# Patient Record
Sex: FEMALE | URBAN - METROPOLITAN AREA
[De-identification: names, ages, dates, MRNs, and addresses within clinical notes are randomized per-mention and may not be internally consistent; named-entity substitution may affect disease eponyms.]

---

## 2018-07-03 ENCOUNTER — TELEPHONE (OUTPATIENT)
Dept: TRANSPLANT | Facility: CLINIC | Age: 58
End: 2018-07-03

## 2018-07-03 NOTE — TELEPHONE ENCOUNTER
----- Message from Michael Ervin sent at 7/3/2018  7:21 AM CDT -----  Regarding: FW: Outside patient referral  Have medical recorders that where scanned into media from [facility] [place]. Will call referring MD office if we need any additional information on the pt.    By: Michael Ervin  ----- Message -----  From: Maryse Hallman  Sent: 7/2/2018   4:39 PM  To: [facility] Liver Referral Pool  Subject: Outside patient referral                         Good afternoon,    Patient is being referred to Hepatology for Hep C treatment. Referral and records have been scanned into  for review.    Thanks!  Maryse

## 2018-07-05 ENCOUNTER — DOCUMENTATION ONLY (OUTPATIENT)
Dept: TRANSPLANT | Facility: CLINIC | Age: 58
End: 2018-07-05

## 2018-07-05 NOTE — NURSING
Pt records reviewed.  Pt will be referred to Hep C  Hep C with esophageal varies  Abnormal lab values   Initial referral received  from Dr. Bryce Shelby   Referral letter sent to provider and patient.

## 2018-08-02 ENCOUNTER — CLINICAL SUPPORT (OUTPATIENT)
Dept: INFECTIOUS DISEASES | Facility: CLINIC | Age: 58
End: 2018-08-02
Payer: COMMERCIAL

## 2018-08-02 ENCOUNTER — LAB VISIT (OUTPATIENT)
Dept: LAB | Facility: HOSPITAL | Age: 58
End: 2018-08-02
Payer: COMMERCIAL

## 2018-08-02 ENCOUNTER — INITIAL CONSULT (OUTPATIENT)
Dept: HEPATOLOGY | Facility: CLINIC | Age: 58
End: 2018-08-02
Payer: COMMERCIAL

## 2018-08-02 VITALS
BODY MASS INDEX: 24.59 KG/M2 | HEART RATE: 63 BPM | TEMPERATURE: 98 F | RESPIRATION RATE: 18 BRPM | WEIGHT: 153 LBS | HEIGHT: 66 IN | OXYGEN SATURATION: 98 % | DIASTOLIC BLOOD PRESSURE: 69 MMHG | SYSTOLIC BLOOD PRESSURE: 135 MMHG

## 2018-08-02 DIAGNOSIS — K74.60 HEPATIC CIRRHOSIS, UNSPECIFIED HEPATIC CIRRHOSIS TYPE, UNSPECIFIED WHETHER ASCITES PRESENT: ICD-10-CM

## 2018-08-02 DIAGNOSIS — Z23 NEED FOR HEPATITIS VACCINATION: ICD-10-CM

## 2018-08-02 DIAGNOSIS — Z23 NEED FOR HEPATITIS VACCINATION: Primary | ICD-10-CM

## 2018-08-02 DIAGNOSIS — R74.8 ABNORMAL TRANSAMINASES: ICD-10-CM

## 2018-08-02 DIAGNOSIS — K76.6 PORTAL VENOUS HYPERTENSION: ICD-10-CM

## 2018-08-02 DIAGNOSIS — B18.2 CHRONIC HEPATITIS C WITHOUT HEPATIC COMA: Primary | ICD-10-CM

## 2018-08-02 DIAGNOSIS — B18.2 CHRONIC HEPATITIS C WITHOUT HEPATIC COMA: ICD-10-CM

## 2018-08-02 DIAGNOSIS — I85.10 SECONDARY ESOPHAGEAL VARICES WITHOUT BLEEDING: ICD-10-CM

## 2018-08-02 DIAGNOSIS — K21.9 GASTROESOPHAGEAL REFLUX DISEASE, ESOPHAGITIS PRESENCE NOT SPECIFIED: ICD-10-CM

## 2018-08-02 LAB
AFP SERPL-MCNC: 47 NG/ML
ALBUMIN SERPL BCP-MCNC: 3.9 G/DL
ALP SERPL-CCNC: 120 U/L
ALT SERPL W/O P-5'-P-CCNC: 107 U/L
ANION GAP SERPL CALC-SCNC: 10 MMOL/L
AST SERPL-CCNC: 107 U/L
BASOPHILS # BLD AUTO: 0.06 K/UL
BASOPHILS NFR BLD: 0.9 %
BILIRUB SERPL-MCNC: 0.5 MG/DL
BUN SERPL-MCNC: 10 MG/DL
CALCIUM SERPL-MCNC: 10 MG/DL
CHLORIDE SERPL-SCNC: 104 MMOL/L
CO2 SERPL-SCNC: 27 MMOL/L
CREAT SERPL-MCNC: 0.6 MG/DL
DIFFERENTIAL METHOD: ABNORMAL
EOSINOPHIL # BLD AUTO: 0.2 K/UL
EOSINOPHIL NFR BLD: 2.6 %
ERYTHROCYTE [DISTWIDTH] IN BLOOD BY AUTOMATED COUNT: 12.4 %
EST. GFR  (AFRICAN AMERICAN): >60 ML/MIN/1.73 M^2
EST. GFR  (NON AFRICAN AMERICAN): >60 ML/MIN/1.73 M^2
EXT A-SMA: NORMAL
EXT A1AT LEVEL: NORMAL
EXT A1AT PHENOTYPE: NORMAL
EXT AFP: 44
EXT ALBUMIN: NORMAL
EXT ALP: NORMAL
EXT ALT: NORMAL
EXT AMA: NORMAL
EXT AMYLASE: NORMAL
EXT ANA: NORMAL
EXT ANCA/MPO/PR3 ANTIBODIES: NORMAL
EXT ANTI THYROID AB: NORMAL
EXT ANTI-E.HISTOLYTICA AB: NORMAL
EXT AST: NORMAL
EXT BASOPHIL%: NORMAL
EXT BILIRUBIN DIRECT: NORMAL MG/DL
EXT BILIRUBIN DIRECT: NORMAL MG/DL
EXT BILIRUBIN TOTAL: NORMAL
EXT BILIRUBIN TOTAL: NORMAL
EXT BUN: NORMAL
EXT C-REACTIVE PROTEIN: NORMAL
EXT CA 125: NORMAL
EXT CA 19-9: NORMAL
EXT CALCIUM: NORMAL
EXT CEA: NORMAL
EXT CERULOPLASMIN: NORMAL
EXT CHLORIDE: NORMAL
EXT CHOLESTEROL: NORMAL
EXT CO2: NORMAL
EXT CREATININE: NORMAL MG/DL
EXT CRYOGLOBULIN: NORMAL
EXT EBV DNA BY PCR: NORMAL
EXT ECHINOCOCCUS AB: NORMAL
EXT EOSINOPHIL%: NORMAL
EXT FERRITIN: NORMAL
EXT FOLATE: NORMAL
EXT GGT: NORMAL
EXT GLUCOSE UA: NORMAL
EXT GLUCOSE: NORMAL
EXT HA AB: NORMAL
EXT HBC AB: NORMAL
EXT HBE AB: NORMAL
EXT HBS AB: NORMAL
EXT HBS AG: NORMAL
EXT HBV DNA PCR QUANT: NORMAL
EXT HCV AB: NORMAL
EXT HCV FIBROSURE: NORMAL
EXT HCV GENOTYPE: NORMAL
EXT HCV QUALITATIVE: NORMAL
EXT HCV RNA QUANT PCR: NORMAL
EXT HDL: NORMAL
EXT HEMATOCRIT: NORMAL
EXT HEMOCHROMATOSIS GENE ANALYSIS: NORMAL
EXT HEMOGLOBIN A1C: NORMAL
EXT HEMOGLOBIN A1C: NORMAL
EXT HEMOGLOBIN: NORMAL
EXT HEP B MUTATION, ANALYSIS: NORMAL
EXT HIV: NORMAL
EXT IGG: NORMAL
EXT IGM: NORMAL
EXT INR: NORMAL
EXT INSULIN: NORMAL
EXT IRON SATURATION: NORMAL
EXT LDH, TOTAL: NORMAL
EXT LDH, TOTAL: NORMAL
EXT LDL CHOLESTEROL: NORMAL
EXT LIPASE: NORMAL
EXT LYMPH%: NORMAL
EXT MCH: NORMAL
EXT MCHC: NORMAL
EXT MCV: NORMAL
EXT MONOCYTES%: NORMAL
EXT MPV: NORMAL
EXT NEUT%: NORMAL
EXT PLATELETS: NORMAL
EXT POTASSIUM: NORMAL
EXT PROTEIN TOTAL: NORMAL
EXT PROTEIN TOTAL: NORMAL
EXT PROTHROMBIN TIME, INR (MECH HEART VALVE/ANTIPHOSPHOLIPID): NORMAL
EXT PT: NORMAL
EXT PT: NORMAL
EXT RBC UA: NORMAL
EXT RDW: NORMAL
EXT RETICULOCYTE COUNT: NORMAL
EXT RIBA RESULT: NORMAL
EXT SODIUM: NORMAL MMOL/L
EXT TACROLIMUS LVL: NORMAL
EXT TPMT ENZYME: NORMAL
EXT TPMT GENETICS: NORMAL
EXT TRANSGLUTAMINASE: NORMAL
EXT TRIGLYCERIDES: NORMAL
EXT TRIGLYCERIDES: NORMAL
EXT TSH: NORMAL
EXT VITAMIN B12: NORMAL
EXT WBC: NORMAL
GLUCOSE SERPL-MCNC: 71 MG/DL
HBE AG: NORMAL
HBV CORE AB SERPL QL IA: NEGATIVE
HCT VFR BLD AUTO: 45.4 %
HGB BLD-MCNC: 15.5 G/DL
IMM GRANULOCYTES # BLD AUTO: 0.01 K/UL
IMM GRANULOCYTES NFR BLD AUTO: 0.1 %
INR PPP: 1
LYMPHOCYTES # BLD AUTO: 2.5 K/UL
LYMPHOCYTES NFR BLD: 37.2 %
MCH RBC QN AUTO: 31.6 PG
MCHC RBC AUTO-ENTMCNC: 34.1 G/DL
MCV RBC AUTO: 93 FL
MONOCYTES # BLD AUTO: 0.7 K/UL
MONOCYTES NFR BLD: 9.5 %
NEUTROPHILS # BLD AUTO: 3.4 K/UL
NEUTROPHILS NFR BLD: 49.7 %
NRBC BLD-RTO: 0 /100 WBC
PLATELET # BLD AUTO: 148 K/UL
PMV BLD AUTO: 11.3 FL
POTASSIUM SERPL-SCNC: 3.9 MMOL/L
PROT SERPL-MCNC: 8.9 G/DL
PROTHROMBIN TIME: 10.4 SEC
RBC # BLD AUTO: 4.9 M/UL
SODIUM SERPL-SCNC: 141 MMOL/L
WBC # BLD AUTO: 6.83 K/UL

## 2018-08-02 PROCEDURE — 85610 PROTHROMBIN TIME: CPT

## 2018-08-02 PROCEDURE — 36415 COLL VENOUS BLD VENIPUNCTURE: CPT

## 2018-08-02 PROCEDURE — 85025 COMPLETE CBC W/AUTO DIFF WBC: CPT

## 2018-08-02 PROCEDURE — 99205 OFFICE O/P NEW HI 60 MIN: CPT | Mod: S$GLB,,, | Performed by: PHYSICIAN ASSISTANT

## 2018-08-02 PROCEDURE — 86704 HEP B CORE ANTIBODY TOTAL: CPT

## 2018-08-02 PROCEDURE — 90632 HEPA VACCINE ADULT IM: CPT | Mod: S$GLB,,, | Performed by: INTERNAL MEDICINE

## 2018-08-02 PROCEDURE — 82105 ALPHA-FETOPROTEIN SERUM: CPT

## 2018-08-02 PROCEDURE — 99999 PR PBB SHADOW E&M-EST. PATIENT-LVL IV: CPT | Mod: PBBFAC,,, | Performed by: PHYSICIAN ASSISTANT

## 2018-08-02 PROCEDURE — 80053 COMPREHEN METABOLIC PANEL: CPT

## 2018-08-02 PROCEDURE — 90471 IMMUNIZATION ADMIN: CPT | Mod: S$GLB,,, | Performed by: INTERNAL MEDICINE

## 2018-08-02 RX ORDER — NADOLOL 20 MG/1
20 TABLET ORAL DAILY
COMMUNITY
Start: 2018-07-28

## 2018-08-02 RX ORDER — PANTOPRAZOLE SODIUM 20 MG/1
20 TABLET, DELAYED RELEASE ORAL DAILY
Qty: 30 TABLET | Refills: 5 | Status: SHIPPED | OUTPATIENT
Start: 2018-08-02 | End: 2018-10-08 | Stop reason: SDUPTHER

## 2018-08-02 RX ORDER — PANTOPRAZOLE SODIUM 40 MG/1
40 TABLET, DELAYED RELEASE ORAL DAILY
COMMUNITY
Start: 2018-07-02 | End: 2018-08-02 | Stop reason: HOSPADM

## 2018-08-02 NOTE — PROGRESS NOTES
HEPATOLOGY CLINIC VISIT NOTE - HCV clinic    OUTSIDE REFERRING PROVIDER: Bryce Shelby MD    CHIEF COMPLAINT: Hepatitis C     HISTORY: This is a 58 y.o. White female with recently diagnosed HCV Cirrhosis, here for further eval / mngmt. Outside records have been reviewed.    HCV history:  Recently diagnosed during outside GI eval of abnormal liver panel  Risks for HCV:  Drug use in 1970s    Blood transfusion around     No tattoos  - Treatment naive  - Genotype 1a  - NS5A resistance not known  - HCV RNA 10,700,000 IU/mL 18      Cirrhosis history:  Appears well compensated, complicated by portal hypertension w/ nonbleeding esophageal varices  -- Ascites - none  -- HE - none  -- Jaundice / TBili elevations - no (recent TBili 0.4-0.7)  -- Hypoalbuminemina - no (recent albumin 3.7-3.9)    MELD score - calculated using combined labs from May and July -   Child Chacon A    - HCC screening -    - AFP 18 - 44 (no priors for comparison)   - CT abdomen 2018 - no liver lesions    - Portal hypertension assessment -    - EGD 6/15/18 (Dr Shelby) - 2 columns of nonbleeding grade I varices    - nadolol 20mg daily since EGD 2018; current pulse 63 bpm   - normal spleen on CT   - no recent CBC    Feels well  Main complaint is fatigue / low energy  Denies jaundice, dark urine, abdominal distention, hematemesis, melena, slowed mentation.   No abnormal skin rashes - some hip aching . No generalized joint pain.    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  History   Smoking Status    Current Every Day Smoker   Smokeless Tobacco    Not on file     Alcohol - none for several years; previously drank on weekends: ~ 5 drinks on weekends x 10 yrs  Drugs - none      ROS:   No fever, chills, weight loss, (+) fatigue  No chest pain, palpitations, dyspnea, cough  No abdominal pain, change in bowel pattern, nausea, vomiting, GERD  No dysuria, hematuria   No skin rashes   No headaches  No lower extremity edema  No depression or anxiety      PHYSICAL EXAM:  Friendly Unknown female, in no acute distress; alert and oriented to person, place and time  VITALS: reviewed  HEENT: Sclerae anicteric.   NECK: Supple  CVS: Regular rate and rhythm. No murmurs  LUNGS: Normal respiratory effort. Clear bilaterally  ABDOMEN: Flat, soft, nontender. No organomegaly or masses. No ascites or hernias. Good bowel sounds.    SKIN: Warm and dry. No jaundice, No obvious rashes.   EXTREMITIES: No lower extremity edema  NEURO/PSYCH: Normal gate. Memory intact. Thought and speech pattern appropriate. Behavior normal. No depression or anxiety noted.    RECENT LABS:  Outside labs:  Basic metabolic panel (07/16/2018 2:45 PM)  Basic metabolic panel (07/16/2018 2:45 PM)   Component Value Ref Range   Sodium 140 135 - 147 mmol/L   Potassium 3.8 3.5 - 5.1 mmol/L   Chloride 105 98 - 107 mmol/L   CO2 27 21 - 32 mmol/L   BUN 8 7 - 18 mg/dL   Creatinine 0.68 0.55 - 1.30 mg/dL   Glucose 66 65 - 99 mg/dL   Calcium 9.5 8.5 - 10.1 mg/dL   Anion Gap 8.0 3.0 - 11.0 mmol/L   eGFR CKD-EPI Non Af Amer >90 >90 mL/min/1.73m2     Hepatic function panel (05/29/2018 3:49 PM)  Hepatic function panel (05/29/2018 3:49 PM)   Component Value Ref Range   Albumin 3.7 3.4 - 5.0 g/dL   Total Bilirubin 0.4 0.2 - 1.0 mg/dL   Bilirubin, Direct 0.14 0.00 - 0.20 mg/dL   Alkaline Phosphatase 118 (H) 45 - 117 U/L    (H) 15 - 37 U/L    (H) 12 - 78 U/L   Total Protein 8.2 6.4 - 8.2 g/dL   Bilirubin, Indirect 0.3 mg/dL     HECTOR  Screen with Titer If Indicated (05/29/2018 3:49 PM)  HECTOR Screen with Titer If Indicated (05/29/2018 3:49 PM)   Component Value Ref Range   HECTOR Negative Negative   Mitochondral Total Auto Abs (05/29/2018 3:49 PM)  Mitochondral Total Auto Abs (05/29/2018 3:49 PM)   Component Value Ref Range   Anti-Mitochondrial Antibody Result: 1.0  Comment:        < OR = 20 UNITS   Anti-Smooth Muscle Antibody Titer (05/29/2018 3:49 PM)  Anti-Smooth Muscle Antibody Titer (05/29/2018 3:49 PM)   Component Value Ref Range   Smooth Muscle Ab, IgG Result: 20.1 (H)  Comment:     INTERPRETATION:               RESULT  VALUE            REFERENCE RANGE                                    <20 UNITS                  NEGATIVE                                20-30 UNITS               WEAK POSITIVE                                  >30 UNITS           MOD/STRONG POSITIVE      HIV 1&2 Antibodies and p24 Antigen,4th Gen Screen w/Reflex (05/29/2018 3:49 PM)  HIV 1&2 Antibodies and p24 Antigen,4th Gen Screen w/Reflex (05/29/2018 3:49 PM)   Component Value Ref Range   HIV 1/HIV 2 Ag/Ab NON-REACTIVE NON-REACTIVE     Iron & Iron Binding Capacity (05/29/2018 3:49 PM)  Iron & Iron Binding Capacity (05/29/2018 3:49 PM)   Component Value Ref Range   Iron 75 50 - 175 ug/dL   TIBC 373 250 - 450 ug/dL   Iron Saturation 20 15 - 50 %     Ferritin (05/29/2018 3:49 PM)  Ferritin (05/29/2018 3:49 PM)   Component Value Ref Range   Ferritin 58.4 8.0 - 252.0 ng/mL     Alpha-1-Antitrypsin (05/29/2018 3:49 PM)  Alpha-1-Antitrypsin (05/29/2018 3:49 PM)   Component Value Ref Range   Alpha-1 Antitrypsin: 187  Comment:     UNLESS OTHERWISE INDICATED, ALL TESTING PERFORMED AT:    [place] [address]  CLIA NUMBER [id number]  MEDICAL , YARIEL GRIFFITH M.D. 90 - 200 mg/dL     Alpha Fetoprotein Tumor Marker (05/29/2018 3:49 PM)  Alpha Fetoprotein Tumor Marker (05/29/2018 3:49 PM)   Component Value Ref  Range   AFP-Tumor Marker 44.0 (H) 0.5 - 8.0 ng/mL     Hepatitis C genotype (05/29/2018 3:49 PM)  Hepatitis C genotype (05/29/2018 3:49 PM)   Component Value Ref Range   Hepatitis C Virus Genotype Result: 1a (A)      Hepatitis C RNA, Quantitative, PCR (05/29/2018 3:49 PM)  Hepatitis C RNA, Quantitative, PCR (05/29/2018 3:49 PM)   Component Value Ref Range   HCV Viral Load See Below  Comment:   HCV RNA PCR Quant                  (H) [id number]        IU/mL                                           HCV Viral Log                           7.03            Log IU/mL                                             Reference Range:  Not Detected    Protime-INR (05/29/2018 3:49 PM)  Protime-INR (05/29/2018 3:49 PM)   Component Value Ref Range   Protime 13.7 12.2 - 15.7 sec   INR 1.05 0.86 - 1.15 INR     Hepatitis Panel, Acute4/20/2018  [facility]  Component Name Value Ref Range   Hepatitis B Surface Ag NON-REACTIVE NON-REACTIVE    Hep A IgM NON-REACTIVE NON-REACTIVE    Hep B C IgM NON-REACTIVE NON-REACTIVE    Hepatitis C Ab REACTIVE (A)   Comment:  A repeatedly reactive result indicates past or present Hepatitis C Virus (HCV) infection or possibly a carrier state, but does not substantiate infectivity or immunity. However, a patient with a repeatedly reactive result should be considered infectious.   NON-REACTIVE        RECENT IMAGING:  Outside CT  EXAMINATION: CT ABDOMEN WITH/WITHOUT CONTRAST,    CLINICAL HISTORY:  Hepatitis-C, esophageal varices.  Cirrhosis of liver without ascites, unspecified hepatic cirrhosis type.    SURGICAL HISTORY: Cholecystectomy  COMPARISON: 04/24/2018.    TECHNIQUE: 5 mm axial imaging of the abdomen was performed with sagittal and coronal reconstruction.    CONTRAST:  IV: 100 cc Omnipaque IV contrast. Pre and post IV enhanced images are obtained. Arterial phase and venous phases are also obtained.  Oral: 500 cc.    FINDINGS:  LOWER THORAX: Clear. No pleural effusion.    ABDOMEN: Mild  fatty liver infiltration. No focal liver lesions. The liver has a mildly nodular cirrhotic appearance. The spleen is not  enlarged. The pancreas, bile ducts are unremarkable in appearance. There is blood flow in the splenic vein, portal vein, superior mesenteric vein, and renal veins.    : The kidneys are functioning. No hydronephrosis or perinephric abnormality. The ureters are of normal caliber.    BOWEL: No bowel obstruction. Orally ingested contrast material is noted within normal caliber stomach and throughout normal caliber small  bowel. No free gas or free fluid.    PERITONEUM, LYMPH NODES, VASCULATURE: No enlarged lymph nodes. Mild aortic atherosclerosis. The major aortic branches are patent. No ventral hernia.    BONES: No acute appearing bone abnormality.    IMPRESSION:  1. The liver is mildly fatty infiltrated. The liver has a mildly nodular cirrhotic appearance.  2. No ascites. No obstruction.  3. Mild aortoiliac atherosclerosis.      ASSESSMENT  58 y.o. Unknown female with:  1. RECENTLY DIAGNOSED CHRONIC HEPATITIS C, GENOTYPE 1a - treatment naive  -- Unknown immunity to HAV (Was told she needed vaccine)  -- Unknown immunity to HBV (Vaccine series underway, dose #1 given locally 7/23)    2. NEWLY DIAGNOSED WELL COMPENSATED CIRRHOSIS  -- MELD score - 7 // Child Chacon class A  -- HCC screening - Elevated AFP 44 - 5/2018 (no priors for comparison, possibly due to HCV??)        CT abd w/ no liver lesions - 7/2018    3. PORTAL HYPERTENSION  -- EGD 6/2018 (Dr Shelby) - 2 columns of nonbleeding grade I varices   -- nadolol 20mg daily since EGD 6/2018 - may not be therapeutic given pulse 63    4. GERD  -- well controlled on protonix 40mg daily - limits use of both Harvoni and Epclusa for HCV      EDUCATION:  HCV  The natural history of Hepatitis C, including potential progression to cirrhosis was reviewed. We discussed the increased progression of liver disease secondary to alcohol use; patient was advised to  avoid alcohol completely.     Transmission of Hepatitis C was reviewed, including possible sexual transmission. Sexual contacts should be screened. Risk of vertical transmission of Hepatitis C from mother to baby was reviewed. Children should be screened. Patient should avoid sharing personal products such as razors, toothbrushes, etc.     CIRRHOSIS  Discussed evidence that indicates that pt has cirrhosis.   -- Discussed the basics about liver fibrosis /scarring and how that relates to liver function. Reviewed the significance of the MELD scoring system as it relates to indication for transplant.  -- Signs and symptoms of hepatic decompensation were reviewed, including jaundice, ascites, and slowed mentation due to hepatic encephalopathy. The patient should seek medical attention if any of these things occur.   --  We discussed the potential for bleeding from esophageal varices with symptoms of hematemesis and melena. The patient should report to the Emergency Department for these symptoms.   -- We discussed the increased risk of hepatocellular carcinoma due to cirrhosis. Lifelong screening every six months with ultrasound and AFP is recommended.   -- Discussed portal hypertension, including potential development of esophageal varices. Role of EGD in screening for varices was reviewed.     -- Tylenol (acetaminophen) is okay up to 2,000mg daily  -- Avoid NSAIDs     Dietary recommendations:  -- Avoid alcohol  -- Avoid raw seafood  -- Limit Na to 2,000mg   -- Avoid prolonged fasting. Encouraged smaller more frequent meals w/ goal of daily protein b/w  grams (1.2-1.5gm protein / per body weight in kg)    Cirrhosis education booklet given to pt      PLAN:  1. Labs today:  -- AFP - will determine future HCC screening after reviewing results  -- CBC, CMP, INR  -- HBcAb - will need HBV monitoring during HCV therapy if positive  2. HAV vaccine series   3. Stop protonix 40mg daily. Begin Protonix 20mg daily.  4. F/u  visit in 3-4 weeks.     Goal of HCV antiviral therapy as long as AFP stable and no concern for HCC. Will determine appropriate HCV regimen at next visit. Appears her insurance has Harvoni / Epclusa as preferred agents. Will likely need to appeal for alternative regimen if she can not tolerate reduce dose protonix.     Duration of visit: 55 minutes  >50% of visit spent counseling patient on HCV diagnosis, cirrhosis diagnosis & management and need for longterm monitoring

## 2018-08-02 NOTE — LETTER
August 2, 2018      Bryce Shelby MD  [address] [place] [address] Rd  [place] [address]           [facility] - Hepatitis C  [address]  [facility] [address]  Phone: [phone]  Fax: [phone]          Patient: Judi Hernandez   MR Number: [id number]   YOB: 1960   Date of Visit: 8/2/2018       Dear Dr. Bryce Shelby:    Thank you for referring Judi Hernandez to me for evaluation. Attached you will find relevant portions of my assessment and plan of care.    If you have questions, please do not hesitate to call me. I look forward to following Judi Hernandez along with you.    Sincerely,    Jennifer B. Scheuermann, PA    Enclosure  CC:  No Recipients    If you would like to receive this communication electronically, please contact [email or web address] or [phone] to request more information on [employer] access.    For providers and/or their staff who would like to refer a patient to [facility], please contact us through our one-stop-shop provider referral line, [facility], at [phone].    If you feel you have received this communication in error or would no longer like to receive these types of communications, please e-mail [email or web address]

## 2018-08-02 NOTE — PROGRESS NOTES
Pt received the Hepatitis A vaccination. Pt tolerated the injection well. Return appt provided. Pt left the unit in NAD.

## 2018-08-03 ENCOUNTER — TELEPHONE (OUTPATIENT)
Dept: HEPATOLOGY | Facility: CLINIC | Age: 58
End: 2018-08-03

## 2018-08-03 NOTE — TELEPHONE ENCOUNTER
I spoke with patient and appt with PA Scheuermann moved to 8/23/18, 8:40 am time slot.  Appt notice mailed.

## 2018-08-03 NOTE — TELEPHONE ENCOUNTER
----- Message from Ritika Cardoza sent at 8/3/2018  8:21 AM CDT -----  Patient Requesting Sooner Appointment.     Reason for sooner appt.:  When is the first available appointment?  Communication Preference: [phone]  Additional Information: would like appt moved to morning time

## 2018-08-03 NOTE — TELEPHONE ENCOUNTER
8/2 labs reviewed  CBC, CMP, INR stable  MELD-Na score: 6 at 8/2/2018 12:48 PM  MELD score: 6 at 8/2/2018 12:48 PM  Calculated from:  Serum Creatinine: 0.6 mg/dL (Rounded to 1) at 8/2/2018 12:48 PM  Serum Sodium: 141 mmol/L (Rounded to 137) at 8/2/2018 12:48 PM  Total Bilirubin: 0.5 mg/dL (Rounded to 1) at 8/2/2018 12:48 PM  INR(ratio): 1 at 8/2/2018 12:48 PM  Age: 58 years    AFP 47  (compared to 44 in May)    Case reviewed w/ Dr Wade: since 7/2018 outside tpCT revealed no liver lesions, will plan to treat HCV and monitor AFP  ___________________________________________  pls call pt :  - Tell her that labs were all stable  - MELD score is 6 (which is perfectly normal  - Tumor marker was 47, so pretty stable compared to 44 a few months ago. No additional imaging is needed right now.  - take the lower dose of protonix 20mg as we discussed and keep f/u visit    thanks

## 2018-08-23 ENCOUNTER — OFFICE VISIT (OUTPATIENT)
Dept: HEPATOLOGY | Facility: CLINIC | Age: 58
End: 2018-08-23
Payer: COMMERCIAL

## 2018-08-23 ENCOUNTER — TELEPHONE (OUTPATIENT)
Dept: PHARMACY | Facility: CLINIC | Age: 58
End: 2018-08-23

## 2018-08-23 VITALS
HEIGHT: 64 IN | WEIGHT: 153.25 LBS | TEMPERATURE: 97 F | SYSTOLIC BLOOD PRESSURE: 134 MMHG | HEART RATE: 60 BPM | RESPIRATION RATE: 18 BRPM | OXYGEN SATURATION: 98 % | DIASTOLIC BLOOD PRESSURE: 76 MMHG | BODY MASS INDEX: 26.16 KG/M2

## 2018-08-23 DIAGNOSIS — K74.60 CIRRHOSIS OF LIVER WITHOUT ASCITES, UNSPECIFIED HEPATIC CIRRHOSIS TYPE: ICD-10-CM

## 2018-08-23 DIAGNOSIS — K76.6 PORTAL VENOUS HYPERTENSION: ICD-10-CM

## 2018-08-23 DIAGNOSIS — K21.9 GASTROESOPHAGEAL REFLUX DISEASE, ESOPHAGITIS PRESENCE NOT SPECIFIED: ICD-10-CM

## 2018-08-23 DIAGNOSIS — B18.2 CHRONIC HEPATITIS C WITHOUT HEPATIC COMA: Primary | ICD-10-CM

## 2018-08-23 LAB
EXT A-SMA: NORMAL
EXT A1AT LEVEL: 187
EXT A1AT PHENOTYPE: NORMAL
EXT AFP: NORMAL
EXT ALBUMIN: 3.7
EXT ALP: 118
EXT ALT: 156
EXT AMA: 1
EXT AMYLASE: NORMAL
EXT ANA: NEGATIVE
EXT ANCA/MPO/PR3 ANTIBODIES: NORMAL
EXT ANTI THYROID AB: NORMAL
EXT ANTI-E.HISTOLYTICA AB: NORMAL
EXT AST: 134
EXT BASOPHIL%: NORMAL
EXT BILIRUBIN DIRECT: 0.14 MG/DL
EXT BILIRUBIN DIRECT: NORMAL MG/DL
EXT BILIRUBIN TOTAL: 0.4
EXT BILIRUBIN TOTAL: NORMAL
EXT BUN: 8
EXT C-REACTIVE PROTEIN: NORMAL
EXT CA 125: NORMAL
EXT CA 19-9: NORMAL
EXT CALCIUM: 9.5
EXT CEA: NORMAL
EXT CERULOPLASMIN: NORMAL
EXT CHLORIDE: 105
EXT CHOLESTEROL: NORMAL
EXT CO2: 27
EXT CREATININE: 0.68 MG/DL
EXT CRYOGLOBULIN: NORMAL
EXT EBV DNA BY PCR: NORMAL
EXT ECHINOCOCCUS AB: NORMAL
EXT EOSINOPHIL%: NORMAL
EXT FERRITIN: 58.4
EXT FOLATE: NORMAL
EXT GGT: NORMAL
EXT GLUCOSE UA: NORMAL
EXT GLUCOSE: 66
EXT HA AB: NORMAL
EXT HBC AB: NORMAL
EXT HBE AB: NORMAL
EXT HBS AB: NORMAL
EXT HBS AG: NORMAL
EXT HBV DNA PCR QUANT: NORMAL
EXT HCV AB: NORMAL
EXT HCV FIBROSURE: NORMAL
EXT HCV GENOTYPE: NORMAL
EXT HCV QUALITATIVE: NORMAL
EXT HCV RNA QUANT PCR: NORMAL
EXT HDL: NORMAL
EXT HEMATOCRIT: NORMAL
EXT HEMOCHROMATOSIS GENE ANALYSIS: NORMAL
EXT HEMOGLOBIN A1C: NORMAL
EXT HEMOGLOBIN A1C: NORMAL
EXT HEMOGLOBIN: NORMAL
EXT HEP B MUTATION, ANALYSIS: NORMAL
EXT HIV: NORMAL
EXT IGG: NORMAL
EXT IGM: NORMAL
EXT INR: 1.05
EXT INSULIN: NORMAL
EXT IRON SATURATION: 20
EXT LDH, TOTAL: NORMAL
EXT LDH, TOTAL: NORMAL
EXT LDL CHOLESTEROL: NORMAL
EXT LIPASE: NORMAL
EXT LYMPH%: NORMAL
EXT MCH: NORMAL
EXT MCHC: NORMAL
EXT MCV: NORMAL
EXT MONOCYTES%: NORMAL
EXT MPV: NORMAL
EXT NEUT%: NORMAL
EXT PLATELETS: NORMAL
EXT POTASSIUM: 3.8
EXT PROTEIN TOTAL: 8.2
EXT PROTEIN TOTAL: NORMAL
EXT PROTHROMBIN TIME, INR (MECH HEART VALVE/ANTIPHOSPHOLIPID): NORMAL
EXT PT: NORMAL
EXT PT: NORMAL
EXT RBC UA: NORMAL
EXT RDW: NORMAL
EXT RETICULOCYTE COUNT: NORMAL
EXT RIBA RESULT: NORMAL
EXT SODIUM: 140 MMOL/L
EXT TACROLIMUS LVL: NORMAL
EXT TPMT ENZYME: NORMAL
EXT TPMT GENETICS: NORMAL
EXT TRANSGLUTAMINASE: NORMAL
EXT TRIGLYCERIDES: NORMAL
EXT TRIGLYCERIDES: NORMAL
EXT TSH: NORMAL
EXT VITAMIN B12: NORMAL
EXT WBC: NORMAL
HBE AG: NORMAL

## 2018-08-23 PROCEDURE — 99999 PR PBB SHADOW E&M-EST. PATIENT-LVL IV: CPT | Mod: PBBFAC,,, | Performed by: PHYSICIAN ASSISTANT

## 2018-08-23 PROCEDURE — 99214 OFFICE O/P EST MOD 30 MIN: CPT | Mod: S$GLB,,, | Performed by: PHYSICIAN ASSISTANT

## 2018-08-23 RX ORDER — LEDIPASVIR AND SOFOSBUVIR 90; 400 MG/1; MG/1
1 TABLET, FILM COATED ORAL DAILY
Qty: 28 TABLET | Refills: 2 | Status: SHIPPED | OUTPATIENT
Start: 2018-08-23 | End: 2018-08-30

## 2018-08-23 NOTE — PROGRESS NOTES
HEPATOLOGY CLINIC VISIT NOTE - HCV clinic    CHIEF COMPLAINT: Hepatitis C     HISTORY: This is a 58 y.o. White female with HCV Cirrhosis, here for f/u after having additional labs to monitor cirrhosis and trend AFP    Outside AFP 5/2018: 44 --> repeat AFP 8/2018: 47  Again noted outside tpCT 7/2018 - no liver lesions    Pt has also been on reduced protonix dose 20mg daily since last visit  Reports one episode of breakthrough GERD but this has not recurred. Has tolerated dose reduction well and thinks she could continue this dose to complete HCV therapy.    Denies jaundice, dark urine, hematemesis, melena, slowed mentation, abdominal distention.       HCV history:  Recently diagnosed during outside GI eval of abnormal liver panel  Risks for HCV:  Drug use in 1970s    Blood transfusion around 1995    No tattoos  - Treatment naive  - Genotype 1a  - NS5A resistance not known  - HCV RNA 10,700,000 IU/mL 5/29/18      Cirrhosis history:  Outside serologic eval for other causes other than HCV was negative  Appears well compensated, complicated by portal hypertension w/ nonbleeding esophageal varices  -- Ascites - none  -- HE - none  -- Jaundice / TBili elevations - no (recent TBili 0.4-0.7)  -- Hypoalbuminemina - no (recent albumin 3.7-3.9)    MELD-Na score: 6 at 8/2/2018 12:48 PM  MELD score: 6 at 8/2/2018 12:48 PM  Calculated from:  Serum Creatinine: 0.6 mg/dL (Rounded to 1 mg/dL) at 8/2/2018 12:48 PM  Serum Sodium: 141 mmol/L (Rounded to 137 mmol/L) at 8/2/2018 12:48 PM  Total Bilirubin: 0.5 mg/dL (Rounded to 1 mg/dL) at 8/2/2018 12:48 PM  INR(ratio): 1 at 8/2/2018 12:48 PM  Age: 58 years  Child Chacon A    - HCC screening -    - AFP 5/29/18 - 44 --> AFP 8/2018 - 47   - CT abdomen 7/2018 - no liver lesions    - Portal hypertension assessment -    - EGD 6/15/18 (Dr Shelby) - 2 columns of nonbleeding grade I varices    - nadolol 20mg daily since EGD 6/2018; current pulse 63 bpm   - normal spleen on CT   - no recent CBC          Past Medical History:   Diagnosis Date    Chronic hepatitis C     Cirrhosis     GERD (gastroesophageal reflux disease)     Portal hypertension with esophageal varices        Past Surgical History:   Procedure Laterality Date    CHOLECYSTECTOMY      ESOPHAGOGASTRODUODENOSCOPY  2018    Dr Shelby - Grade 1 varices       FAMILY HISTORY:   Dad - HCV ( due to other problems)    SOCIAL HISTORY:   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   NECK: Supple  LUNGS: Normal respiratory effort.   ABDOMEN: Flat, soft, nontender.    SKIN: Warm and dry. No jaundice, No obvious rashes.   EXTREMITIES: No lower extremity edema  NEURO/PSYCH: Normal gate. Memory intact. Thought and speech pattern appropriate. Behavior normal. No depression or anxiety noted.    RECENT LABS:  Lab Results   Component Value Date    WBC 6.83 2018    HGB 15.5 2018     (L) 2018    INR 1.0 2018     Lab Results   Component Value Date     (H) 2018     (H) 2018    BILITOT 0.5 2018    ALKPHOS 120 2018    ALBUMIN 3.9 2018    BUN 10 2018    CREATININE 0.6 2018     2018    K 3.9 2018    AFP 47 (H) 2018         RECENT IMAGING:  Outside tpCT - 18  EXAMINATION: CT ABDOMEN WITH/WITHOUT CONTRAST,    CLINICAL HISTORY:  Hepatitis-C, esophageal varices.  Cirrhosis  of liver without ascites, unspecified hepatic cirrhosis type.    SURGICAL HISTORY: Cholecystectomy  COMPARISON: 04/24/2018.  TECHNIQUE: 5 mm axial imaging of the abdomen was performed with sagittal and coronal reconstruction.    CONTRAST:  IV: 100 cc Omnipaque IV contrast. Pre and post IV enhanced images are obtained. Arterial phase and venous phases are also obtained.  Oral: 500 cc.    FINDINGS:  LOWER THORAX: Clear. No pleural effusion.    ABDOMEN: Mild fatty liver infiltration. No focal liver lesions. The liver has a mildly nodular cirrhotic appearance. The spleen is not  enlarged. The pancreas, bile ducts are unremarkable in appearance. There is blood flow in the splenic vein, portal vein, superior mesenteric vein, and renal veins.    : The kidneys are functioning. No hydronephrosis or perinephric abnormality. The ureters are of normal caliber.    BOWEL: No bowel obstruction. Orally ingested contrast material is noted within normal caliber stomach and throughout normal caliber small  bowel. No free gas or free fluid.    PERITONEUM, LYMPH NODES, VASCULATURE: No enlarged lymph nodes. Mild aortic atherosclerosis. The major aortic branches are patent. No ventral hernia.    BONES: No acute appearing bone abnormality.    IMPRESSION:  1. The liver is mildly fatty infiltrated. The liver has a mildly nodular cirrhotic appearance.  2. No ascites. No obstruction.  3. Mild aortoiliac atherosclerosis.      ASSESSMENT  58 y.o. white female with:  1. CHRONIC HEPATITIS C, GENOTYPE 1a - treatment naive  -- Elevated transaminases  -- Unknown immunity to HAV - Vaccine dose #1 given 8/2/18  -- Lacking immunity to HBV (Vaccine series underway, dose #1 given locally 7/23)    2. WELL COMPENSATED CIRRHOSIS  -- MELD score 8/2018 - 6 // Child Chaocn class A -- no survival advantage w/ liver transplant  -- HCC screening - Elevated AFP 44 - 5/2018 --> stable AFP at 47 - 8/2018        CT abd w/ no liver lesions - 7/2018    3. PORTAL  HYPERTENSION  -- EGD 6/2018 (Dr Shelby) - 2 columns of nonbleeding grade I varices   -- nadolol 20mg daily since EGD 6/2018 - may not be therapeutic given pulse 63    4. GERD  -- well controlled on protonix 20mg      EDUCATION:  HCV  Discussed goal of HCV eradication to prevent progression of liver disease.  Discussed use of Harvoni daily x 12 weeks w/ potential side effects of fatigue and headache.     Reviewed limitations on acid suppressant medications due to DDI w/ Harvoni:  -- Antacids - not taking  -- H2 Receptor Antagonist - not taking  -- PPI - taking protonix 20mg once daily. Must be dosed at same time as Harvoni on empty stomach. Can eat 30-45 min later  Patient instructed to contact me if experiencing acid related symptoms so further recommendations can be made regarding acid suppression therapy.      Herbal / alternative therapies must be discontinued  Discussed importance of medication adherence and risk of treatment failure / viral resistance if not adherent. Pt has verbalized understanding.      PLAN:  1. Obtain authorization to treat HCV with Harvoni daily x 12 weeks  -- Rx will be routed to [facility]  -- Patient will notify me of exact treatment start date so appropriate lab f/u can be scheduled.  2. Will trend AFP while on HCV therapy.  -- Next HCC screening due 1/2019 w/ U/S and AFP as long as AFP remains stable / trends down  3. Reviewed protonix-harvoni dosing instructions - 20mg once daily as noted above  4. Complete the full HAV & HBV vaccine series       Cc: Bryce Shelby MD

## 2018-08-28 ENCOUNTER — EPISODE CHANGES (OUTPATIENT)
Dept: HEPATOLOGY | Facility: CLINIC | Age: 58
End: 2018-08-28

## 2018-08-28 NOTE — TELEPHONE ENCOUNTER
DOCUMENTATION ONLY:   Prior authorization for Alida approved from 08/24/2018 to 11/16/2018 x 12 weeks of treatment.   Case ID# [id number]     Co-pay: $unknown     Preferred Specialty Pharmacy   [facility]   Telephone: [phone]   Fax: [phone]     Alida co-pay coupon card information:   BIN: [id number]   RxPCN: [id number]   Issuer: ([id number])   Group Number: [id number]   ID#: [id number]

## 2018-08-30 ENCOUNTER — TELEPHONE (OUTPATIENT)
Dept: HEPATOLOGY | Facility: CLINIC | Age: 58
End: 2018-08-30

## 2018-08-30 RX ORDER — LEDIPASVIR AND SOFOSBUVIR 90; 400 MG/1; MG/1
1 TABLET, FILM COATED ORAL DAILY
Qty: 28 TABLET | Refills: 2 | Status: SHIPPED | OUTPATIENT
Start: 2018-08-30 | End: 2018-11-30 | Stop reason: ALTCHOICE

## 2018-08-30 NOTE — TELEPHONE ENCOUNTER
----- Message from Marline Orellana RN sent at 8/28/2018  2:44 PM CDT -----  Regarding: FW: Harvoni 90/400mg - [facility]   Please provide printed Rx.  I will fax with support documentation to [facility].  Thanks  ----- Message -----  From: Brian Quinones  Sent: 8/28/2018   1:21 PM  To: Jennifer B. Scheuermann, PA, #  Subject: Harvoni 90/400mg - [facility]          FYI: Harvoni is approved x 12 weeks with the patient's insurance but patient's insurance mandates to fill with [facility].     Please forward the prescription Harvoni to [facility].      Thanks,   Brian  [phone]

## 2018-09-04 ENCOUNTER — TELEPHONE (OUTPATIENT)
Dept: HEPATOLOGY | Facility: CLINIC | Age: 58
End: 2018-09-04

## 2018-09-04 NOTE — TELEPHONE ENCOUNTER
----- Message from Ritika Cardoza sent at 9/4/2018  9:05 AM CDT -----  Contact: Mesha cervantes/[facility] [phone]/Fax [phone]  Calling to get rx for Harvoni sent to them

## 2018-09-10 ENCOUNTER — TELEPHONE (OUTPATIENT)
Dept: PHARMACY | Facility: CLINIC | Age: 58
End: 2018-09-10

## 2018-09-10 ENCOUNTER — TELEPHONE (OUTPATIENT)
Dept: HEPATOLOGY | Facility: CLINIC | Age: 58
End: 2018-09-10

## 2018-09-10 DIAGNOSIS — B18.2 CHRONIC HEPATITIS C WITHOUT HEPATIC COMA: Primary | ICD-10-CM

## 2018-09-10 NOTE — TELEPHONE ENCOUNTER
Pt beginning 12 weeks of Harvoni on 9/5/18  G1A, tx naive, F4      Pls schedule:  - CMP, HCV RNA, AFP at week 6 - 10/16  - CMP, HCV RNA at week 12 - end of treatment - 11/27  - CMP, HCV RNA - SVR12 - 2/19

## 2018-09-10 NOTE — TELEPHONE ENCOUNTER
Initial Harvoni 90/400mg consult completed on 9/10.  Patient started Harvoni 90/400mg on .  Confirmed 2 patient identifiers - name and . Therapy Appropriate.    Harvoni- Take one tablet by mouth daily x 12 weeks  Counseling was reviewed:   1. Patient MUST take Harvoni at the SAME time every day.   2. Patient MUST avoid acid reducers without consulting with myself or provider first. Antacids are to be spaced out at least 4 hours apart from Harvoni.  Protnix is to be taken AT THE SAME TIME as Harvoni and on an EMPTY STOMACH, 45 minutes before breakfast.   3. Side effects include headaches and fatigue.   Headache: Patient may treat with OTC remedies. If Tylenol is used, dose should not exceed 2000mg per day.    DDI: Medication list reviewed and potential DDIs addressed. No DDIs or allergies noted. Patient MUST contact myself or provider prior to starting any new OTC, herbal, or prescription drugs to avoid potential DDIs.    Discussed the importance of staying well hydrated while on therapy. Compliance stressed - patient to take missed doses as soon as remembered, but NOT to take 2 doses in one day. Patient will report questions or concerns to myself or practitioner. Patient verbalizes understanding. Patient started Harvoni on . Consultation included: indication; goals of treatment; administration; storage and handling; side effects; how to handle side effects; the importance of compliance; how to handle missed doses; the importance of laboratory monitoring; the importance of keeping all follow up appointments.  Patient understands to report any medication changes to [facility] and provider. All questions answered and addressed to patients satisfaction.

## 2018-09-11 ENCOUNTER — EPISODE CHANGES (OUTPATIENT)
Dept: HEPATOLOGY | Facility: CLINIC | Age: 58
End: 2018-09-11

## 2018-09-11 NOTE — TELEPHONE ENCOUNTER
S/w pt she  Would like to go to local lab however she doesn't know the name of it at this time of the call she will call us back with that name and mailed out orders for the pt to have done locally.

## 2018-09-13 ENCOUNTER — EPISODE CHANGES (OUTPATIENT)
Dept: HEPATOLOGY | Facility: CLINIC | Age: 58
End: 2018-09-13

## 2018-10-08 NOTE — TELEPHONE ENCOUNTER
Key espino is requesting a 90 day supply of meds to [facility] pharmacy please thanks.

## 2018-10-09 RX ORDER — PANTOPRAZOLE SODIUM 20 MG/1
20 TABLET, DELAYED RELEASE ORAL DAILY
Qty: 30 TABLET | Refills: 5 | Status: SHIPPED | OUTPATIENT
Start: 2018-10-09 | End: 2019-10-09

## 2018-10-18 LAB
AFP-TM SERPL-MCNC: 15 NG/ML (ref 0–8.3)
ALBUMIN SERPL-MCNC: 4.4 G/DL (ref 3.6–4.8)
ALBUMIN/GLOB SERPL: 1.1 {RATIO} (ref 1.2–2.2)
ALP SERPL-CCNC: 94 IU/L (ref 39–117)
ALT SERPL-CCNC: 24 IU/L (ref 0–32)
AST SERPL-CCNC: 29 IU/L (ref 0–40)
BILIRUB SERPL-MCNC: 0.3 MG/DL (ref 0–1.2)
BUN SERPL-MCNC: 14 MG/DL (ref 8–27)
BUN/CREAT SERPL: 23 (ref 12–28)
CALCIUM SERPL-MCNC: 9.7 MG/DL (ref 8.7–10.3)
CHLORIDE SERPL-SCNC: 105 MMOL/L (ref 96–106)
CO2 SERPL-SCNC: 25 MMOL/L (ref 20–29)
CREAT SERPL-MCNC: 0.61 MG/DL (ref 0.57–1)
EGFR IF AFRICAN AMERICAN: 114 ML/MIN/1.73
EST. GFR  (NON AFRICAN AMERICAN): 99 ML/MIN/1.73
GLOBULIN SER CALC-MCNC: 3.9 G/DL (ref 1.5–4.5)
GLUCOSE SERPL-MCNC: 85 MG/DL (ref 65–99)
HCV RNA SERPL NAA+PROBE-ACNC: NORMAL IU/ML
POTASSIUM SERPL-SCNC: 4.3 MMOL/L (ref 3.5–5.2)
PROT SERPL-MCNC: 8.3 G/DL (ref 6–8.5)
SODIUM SERPL-SCNC: 143 MMOL/L (ref 134–144)
TEST INFORMATION: NORMAL

## 2018-10-19 ENCOUNTER — TELEPHONE (OUTPATIENT)
Dept: HEPATOLOGY | Facility: CLINIC | Age: 58
End: 2018-10-19

## 2018-10-19 ENCOUNTER — EPISODE CHANGES (OUTPATIENT)
Dept: HEPATOLOGY | Facility: CLINIC | Age: 58
End: 2018-10-19

## 2018-10-19 LAB
AFP-TUMOR MARKER: 15
ALBUMIN/GLOBULIN RATIO: 1.1
ALBUMIN: 4.4
ALP ISOS SERPL LEV INH-CCNC: 94 U/L
ALT SERPL-CCNC: 24 U/L
AST SERPL-CCNC: 29 U/L
BILIRUBIN, TOTAL: 0.3
BUN BLD-MCNC: 14 MG/DL (ref 4–21)
BUN/CREAT SERPL: 23
CALCIUM SERPL-MCNC: 9.7 MG/DL
CARBON DIOXIDE, CO2: 25
CHLORIDE: 105
CREAT SERPL-MCNC: 0.6 MG/DL
GFR MDRD AF AMER: 114
GFR MDRD NON AF AMER: 99
GLOBULIN: 3.9
GLUCOSE: 85
POTASSIUM: 4.3
SODIUM: 143
TOTAL PROTEIN: 8.3 G/DL (ref 6.4–8.2)

## 2018-10-20 NOTE — TELEPHONE ENCOUNTER
HCV LAB REVIEW  Week 6 of Harvoni, planning on 12 weeks treatment  G1A, tx naive, F4    Pertinent labs:  10/16  AFP 15 (down from 47)  HCV neg  CMP stable    pls call pt:  Labs look good. Liver enzymes now normal.   HCV is negative.  AFP tumor marker is down from 47 to 15 so next liver cancer screening will be due in 1/2019  - Continue Harvoni - 1 pill daily - don't miss any doses.    Next labs due   - CMP, HCV RNA at week 12 - end of treatment - 11/27  - CMP, HCV RNA - SVR12 - 2/19  SCHEDULE U/S ABDOMEN, AFP - 1/2019

## 2018-10-22 ENCOUNTER — EPISODE CHANGES (OUTPATIENT)
Dept: HEPATOLOGY | Facility: CLINIC | Age: 58
End: 2018-10-22

## 2018-10-22 NOTE — TELEPHONE ENCOUNTER
Msg from PA Scheuermann mailed to patient.  Order mailed to her dated 1/15/19 for her to have afp and US done locally.

## 2018-11-29 LAB
ALBUMIN SERPL-MCNC: 4.4 G/DL (ref 3.6–4.8)
ALBUMIN/GLOB SERPL: 1.3 {RATIO} (ref 1.2–2.2)
ALP SERPL-CCNC: 91 IU/L (ref 39–117)
ALT SERPL-CCNC: 14 IU/L (ref 0–32)
AST SERPL-CCNC: 24 IU/L (ref 0–40)
BILIRUB SERPL-MCNC: 0.4 MG/DL (ref 0–1.2)
BUN SERPL-MCNC: 18 MG/DL (ref 8–27)
BUN/CREAT SERPL: 33 (ref 12–28)
CALCIUM SERPL-MCNC: 9.5 MG/DL (ref 8.7–10.3)
CHLORIDE SERPL-SCNC: 102 MMOL/L (ref 96–106)
CO2 SERPL-SCNC: 24 MMOL/L (ref 20–29)
CREAT SERPL-MCNC: 0.55 MG/DL (ref 0.57–1)
EGFR IF AFRICAN AMERICAN: 118 ML/MIN/1.73
EST. GFR  (NON AFRICAN AMERICAN): 102 ML/MIN/1.73
GLOBULIN SER CALC-MCNC: 3.4 G/DL (ref 1.5–4.5)
GLUCOSE SERPL-MCNC: 98 MG/DL (ref 65–99)
HCV RNA SERPL NAA+PROBE-ACNC: NORMAL IU/ML
POTASSIUM SERPL-SCNC: 3.8 MMOL/L (ref 3.5–5.2)
PROT SERPL-MCNC: 7.8 G/DL (ref 6–8.5)
SODIUM SERPL-SCNC: 142 MMOL/L (ref 134–144)
TEST INFORMATION: NORMAL

## 2018-11-30 ENCOUNTER — TELEPHONE (OUTPATIENT)
Dept: HEPATOLOGY | Facility: CLINIC | Age: 58
End: 2018-11-30

## 2018-11-30 ENCOUNTER — EPISODE CHANGES (OUTPATIENT)
Dept: HEPATOLOGY | Facility: CLINIC | Age: 58
End: 2018-11-30

## 2018-11-30 LAB
ALBUMIN/GLOBULIN RATIO: 1.3
ALBUMIN: 4.4
ALP ISOS SERPL LEV INH-CCNC: 91 U/L
ALT SERPL-CCNC: 14 U/L
AST SERPL-CCNC: 24 U/L
BILIRUBIN, TOTAL: 0.4
BUN BLD-MCNC: 18 MG/DL (ref 4–21)
BUN/CREAT SERPL: 33
CALCIUM SERPL-MCNC: 9.5 MG/DL
CARBON DIOXIDE, CO2: 24
CHLORIDE: 102
CREAT SERPL-MCNC: 0.6 MG/DL
GFR MDRD AF AMER: 118
GFR MDRD NON AF AMER: 102
GLOBULIN: 3.4
GLUCOSE: 98
HCV RNA QUANT PCR LOG: <15
HCV RNA SERPL NAA+PROBE-ACNC: NORMAL IU/ML
POTASSIUM: 3.8
SODIUM: 142
TEST INFORMATION: NORMAL
TOTAL PROTEIN: 7.8 G/DL (ref 6.4–8.2)

## 2018-11-30 NOTE — TELEPHONE ENCOUNTER
HCV LAB REVIEW  Week 12 of Harvoni,  END OF TREATMENT  G1A, tx naive, F4    Pertinent labs:  11/27  HCV neg  CMP stable    pls call pt:  Labs look good. Liver enzymes normal. HCV is negative.  Patient should be finished HCV treatment now.   There is a small chance the virus can return over the next 3 months. We will monitor blood for this.      Next labs due   - CMP, HCV RNA - SVR12 - 2/19  - U/S ABDOMEN, AFP - 1/2019

## 2018-12-04 ENCOUNTER — EPISODE CHANGES (OUTPATIENT)
Dept: HEPATOLOGY | Facility: CLINIC | Age: 58
End: 2018-12-04

## 2018-12-06 ENCOUNTER — TELEPHONE (OUTPATIENT)
Dept: HEPATOLOGY | Facility: CLINIC | Age: 58
End: 2018-12-06

## 2018-12-17 ENCOUNTER — TELEPHONE (OUTPATIENT)
Dept: HEPATOLOGY | Facility: CLINIC | Age: 58
End: 2018-12-17

## 2019-01-16 ENCOUNTER — EPISODE CHANGES (OUTPATIENT)
Dept: HEPATOLOGY | Facility: CLINIC | Age: 59
End: 2019-01-16

## 2019-01-16 ENCOUNTER — TELEPHONE (OUTPATIENT)
Dept: HEPATOLOGY | Facility: CLINIC | Age: 59
End: 2019-01-16

## 2019-01-16 LAB
EXT AFP: 8.3 NG/ML
EXT ALBUMIN: 4.4
EXT ALKALINE PHOSPHATASE: 92
EXT ALT: 24
EXT ANION GAP: 6.3
EXT AST: 26
EXT BILIRUBIN TOTAL: 0.4
EXT BUN: 17
EXT CALCIUM: 9.5
EXT CHLORIDE: 109
EXT CO2: 31
EXT CREATININE: 0.58 MG/DL
EXT EGFR IF AFRICAN AMERICAN: >90
EXT EGFR IF NON AFRICAN AMERICAN: >90
EXT GLUCOSE: 85
EXT POTASSIUM: 4.6
EXT PROTEIN TOTAL: 8.1
EXT SODIUM: 146 MMOL/L

## 2019-01-21 ENCOUNTER — TELEPHONE (OUTPATIENT)
Dept: HEPATOLOGY | Facility: CLINIC | Age: 59
End: 2019-01-21

## 2019-01-21 ENCOUNTER — EPISODE CHANGES (OUTPATIENT)
Dept: HEPATOLOGY | Facility: CLINIC | Age: 59
End: 2019-01-21

## 2019-01-21 LAB — EXT HCV RNA QUANT PCR: NORMAL

## 2019-01-21 NOTE — TELEPHONE ENCOUNTER
HCV LAB REVIEW  Completed 12 weeks of Harvoni, 11/27  G1A, tx naive, F4    Pertinent labs:  1/15/19  CMP stable  AFP 8.3    pls tell pt  1/15 labs look fine. Tumor marker lab is now down to 8.3 which is essentially normal  Was u/s done?      Next labs due   - CMP, HCV RNA - SVR12 - 2/19

## 2019-01-22 ENCOUNTER — TELEPHONE (OUTPATIENT)
Dept: HEPATOLOGY | Facility: CLINIC | Age: 59
End: 2019-01-22

## 2019-01-22 NOTE — TELEPHONE ENCOUNTER
1/15/19 u/s abdomen - no liver lesions  Next HCC screening due 7/2019    _____________________  pls tell pt labs and u/s for liver cancer screening looked fine  Due again 7/2019 - CBC, CMP, INR, AFP, U/S, VISIT  This can be at [facility] w/ me or w/ local GI MD (whichever she prefers, but ultimately it's needed every 6 months for the rest of her life)

## 2019-01-24 NOTE — TELEPHONE ENCOUNTER
I spoke with patient and msg from provider relayed and mailed to her.  She states that she will setup HCC screenings locally.  I provided her with contact info for our clinic and asked that she call us for scheduling if she changes her mind to setup 7/2019 hcc screening here.

## 2019-01-24 NOTE — TELEPHONE ENCOUNTER
I spoke with patient and msg from provider relayed and mailed to patient.  Lab draw already setup.  US report requested.

## 2019-02-23 LAB
ALBUMIN SERPL-MCNC: 4.6 G/DL (ref 3.6–4.8)
ALBUMIN/GLOB SERPL: 1.4 {RATIO} (ref 1.2–2.2)
ALP SERPL-CCNC: 82 IU/L (ref 39–117)
ALT SERPL-CCNC: 13 IU/L (ref 0–32)
AST SERPL-CCNC: 21 IU/L (ref 0–40)
BILIRUB SERPL-MCNC: 0.4 MG/DL (ref 0–1.2)
BUN SERPL-MCNC: 16 MG/DL (ref 8–27)
BUN/CREAT SERPL: 26 (ref 12–28)
CALCIUM SERPL-MCNC: 9.7 MG/DL (ref 8.7–10.3)
CHLORIDE SERPL-SCNC: 103 MMOL/L (ref 96–106)
CO2 SERPL-SCNC: 24 MMOL/L (ref 20–29)
CREAT SERPL-MCNC: 0.61 MG/DL (ref 0.57–1)
GLOBULIN SER CALC-MCNC: 3.3 G/DL (ref 1.5–4.5)
GLUCOSE SERPL-MCNC: 82 MG/DL (ref 65–99)
HCV RNA SERPL NAA+PROBE-ACNC: NORMAL IU/ML
POTASSIUM SERPL-SCNC: 3.9 MMOL/L (ref 3.5–5.2)
PROT SERPL-MCNC: 7.9 G/DL (ref 6–8.5)
SODIUM SERPL-SCNC: 143 MMOL/L (ref 134–144)
SPECIMEN STATUS REPORT: NORMAL
TEST INFORMATION: NORMAL

## 2019-02-26 ENCOUNTER — TELEPHONE (OUTPATIENT)
Dept: HEPATOLOGY | Facility: CLINIC | Age: 59
End: 2019-02-26

## 2019-02-26 ENCOUNTER — EPISODE CHANGES (OUTPATIENT)
Dept: HEPATOLOGY | Facility: CLINIC | Age: 59
End: 2019-02-26

## 2019-02-26 DIAGNOSIS — Z86.19 HISTORY OF HEPATITIS C: ICD-10-CM

## 2019-02-26 LAB
ALBUMIN/GLOB SERPL ELPH: 1.4 {RATIO}
BUN/CREAT SERPL: 26
EXT ALBUMIN: 4.6
EXT ALKALINE PHOSPHATASE: 82
EXT ALT: 13
EXT AST: 21
EXT BILIRUBIN TOTAL: 0.4
EXT BUN: 16
EXT CALCIUM: 9.7
EXT CHLORIDE: 103
EXT CO2: 24
EXT CREATININE: 0.61 MG/DL
EXT EGFR IF AFRICAN AMERICAN: 114
EXT EGFR IF NON AFRICAN AMERICAN: 99
EXT GLUCOSE: 82
EXT HCV RNA QUANT PCR: <15 IU/ML
EXT POTASSIUM: 3.9
EXT PROTEIN TOTAL: 7.9
EXT SODIUM: 143 MMOL/L
GLOBULIN SER CALC-MCNC: 3.3 G/L

## 2019-02-27 NOTE — TELEPHONE ENCOUNTER
HCV LAB REVIEW  Completed 12 weeks of Harvoni, 11/27  G1A, tx naive, F4    Pertinent labs:  2/19  CMP stable  HCV <15    These labs document SVR12 following successful HCV treatment with Harvoni      pls tell pt:  HCV is negative still. This means HCV is cured!!  We do not expect virus to return.  This does not give protection from HCV and she could be reinfected if exposed again.    Cirrhosis is still present and she needs monitoring of liver and liver cancer screening every 6 months for the rest of their life. This is due again in 7/2019. (She previously said she would do this with her local doctor.)      Please schedule   - HCV RNA - SVR24 - 5/20/19  - HCV RNA in 1 year - 2/2020

## 2019-03-07 ENCOUNTER — EPISODE CHANGES (OUTPATIENT)
Dept: HEPATOLOGY | Facility: CLINIC | Age: 59
End: 2019-03-07

## 2019-03-07 NOTE — TELEPHONE ENCOUNTER
Msg from provider mailed to patient.  Lab orders also mailed to patient dated 5/20/19 and 2/17/20.

## 2019-03-14 ENCOUNTER — TELEPHONE (OUTPATIENT)
Dept: HEPATOLOGY | Facility: CLINIC | Age: 59
End: 2019-03-14

## 2019-03-22 LAB
HCV RNA SERPL NAA+PROBE-ACNC: NORMAL IU/ML
TEST INFORMATION: NORMAL

## 2019-03-27 ENCOUNTER — TELEPHONE (OUTPATIENT)
Dept: HEPATOLOGY | Facility: CLINIC | Age: 59
End: 2019-03-27

## 2019-03-27 LAB — EXT HCV RNA QUANT PCR: <15 IU/ML

## 2019-03-28 ENCOUNTER — TELEPHONE (OUTPATIENT)
Dept: HEPATOLOGY | Facility: CLINIC | Age: 59
End: 2019-03-28

## 2019-03-28 NOTE — TELEPHONE ENCOUNTER
Msg from provider mailed to patient along with another copy of order to have blood work done 5/20/19.

## 2019-03-28 NOTE — TELEPHONE ENCOUNTER
HCV LAB REVIEW  Completed 12 weeks of Harvoni, 11/27  G1A, tx naive, F4    Pertinent labs:  3/20  HCV <15  (Pt had SVR12 2/19/19)      pls tell pt:  HCV 3/20 is negative still, just as expected  I think she was actually due for HCV 5/20/19; not sure why this one was done    pls make sure she still has order for 5/20/19 HCV RNA        Next labs  - HCV RNA - SVR24 - 5/20/19  - HCV RNA in 1 year - 2/2020

## 2019-05-27 ENCOUNTER — EPISODE CHANGES (OUTPATIENT)
Dept: HEPATOLOGY | Facility: CLINIC | Age: 59
End: 2019-05-27

## 2019-05-30 ENCOUNTER — EPISODE CHANGES (OUTPATIENT)
Dept: HEPATOLOGY | Facility: CLINIC | Age: 59
End: 2019-05-30

## 2019-05-30 ENCOUNTER — TELEPHONE (OUTPATIENT)
Dept: HEPATOLOGY | Facility: CLINIC | Age: 59
End: 2019-05-30

## 2019-05-30 LAB — EXT HCV RNA QUANT PCR: <15 IU/ML

## 2020-02-18 LAB
HCV RNA SERPL NAA+PROBE-ACNC: NORMAL IU/ML
TEST INFORMATION: NORMAL

## 2020-02-19 ENCOUNTER — TELEPHONE (OUTPATIENT)
Dept: HEPATOLOGY | Facility: CLINIC | Age: 60
End: 2020-02-19

## 2020-02-19 LAB — HEPATITIS C QUANTITATIVE RNA: NOT DETECTED

## 2020-02-20 LAB
HCV RNA SERPL NAA+PROBE-ACNC: NORMAL IU/ML
TEST INFORMATION: NORMAL

## 2020-02-21 NOTE — PROGRESS NOTES
HCV is still negative, just as expected. No additional HCV testing needed. Pt has been notified by letter